# Patient Record
Sex: MALE | Race: ASIAN
[De-identification: names, ages, dates, MRNs, and addresses within clinical notes are randomized per-mention and may not be internally consistent; named-entity substitution may affect disease eponyms.]

---

## 2018-08-22 ENCOUNTER — HOSPITAL ENCOUNTER (OUTPATIENT)
Dept: HOSPITAL 92 - BICMRI | Age: 43
Discharge: HOME | End: 2018-08-22
Attending: INTERNAL MEDICINE
Payer: COMMERCIAL

## 2018-08-22 DIAGNOSIS — M79.641: ICD-10-CM

## 2018-08-22 DIAGNOSIS — M06.4: Primary | ICD-10-CM

## 2020-05-03 ENCOUNTER — HOSPITAL ENCOUNTER (EMERGENCY)
Dept: HOSPITAL 92 - ERS | Age: 45
Discharge: HOME | End: 2020-05-03
Payer: COMMERCIAL

## 2020-05-03 DIAGNOSIS — F90.9: ICD-10-CM

## 2020-05-03 DIAGNOSIS — R06.00: Primary | ICD-10-CM

## 2020-05-03 DIAGNOSIS — Z79.899: ICD-10-CM

## 2020-05-03 DIAGNOSIS — I10: ICD-10-CM

## 2020-05-03 DIAGNOSIS — Z20.828: ICD-10-CM

## 2020-05-03 LAB
ALBUMIN SERPL BCG-MCNC: 4.5 G/DL (ref 3.5–5)
ALP SERPL-CCNC: 74 U/L (ref 40–110)
ALT SERPL W P-5'-P-CCNC: 32 U/L (ref 8–55)
ANION GAP SERPL CALC-SCNC: 15 MMOL/L (ref 10–20)
AST SERPL-CCNC: 21 U/L (ref 5–34)
BASOPHILS # BLD AUTO: 0.1 THOU/UL (ref 0–0.2)
BASOPHILS NFR BLD AUTO: 1.4 % (ref 0–1)
BILIRUB SERPL-MCNC: 0.4 MG/DL (ref 0.2–1.2)
BUN SERPL-MCNC: 11 MG/DL (ref 8.9–20.6)
CALCIUM SERPL-MCNC: 9.4 MG/DL (ref 7.8–10.44)
CHLORIDE SERPL-SCNC: 109 MMOL/L (ref 98–107)
CK SERPL-CCNC: 120 U/L (ref 30–200)
CO2 SERPL-SCNC: 20 MMOL/L (ref 22–29)
CREAT CL PREDICTED SERPL C-G-VRATE: 0 ML/MIN (ref 70–130)
EOSINOPHIL # BLD AUTO: 0.3 THOU/UL (ref 0–0.7)
EOSINOPHIL NFR BLD AUTO: 4.4 % (ref 0–10)
GLOBULIN SER CALC-MCNC: 3.2 G/DL (ref 2.4–3.5)
GLUCOSE SERPL-MCNC: 102 MG/DL (ref 70–105)
HGB BLD-MCNC: 16 G/DL (ref 14–18)
LIPASE SERPL-CCNC: 96 U/L (ref 8–78)
LYMPHOCYTES # BLD: 3.4 THOU/UL (ref 1.2–3.4)
LYMPHOCYTES NFR BLD AUTO: 47.1 % (ref 21–51)
MCH RBC QN AUTO: 28.9 PG (ref 27–31)
MCV RBC AUTO: 84.9 FL (ref 78–98)
MONOCYTES # BLD AUTO: 0.6 THOU/UL (ref 0.11–0.59)
MONOCYTES NFR BLD AUTO: 7.6 % (ref 0–10)
NEUTROPHILS # BLD AUTO: 2.9 THOU/UL (ref 1.4–6.5)
NEUTROPHILS NFR BLD AUTO: 39.6 % (ref 42–75)
PLATELET # BLD AUTO: 260 THOU/UL (ref 130–400)
POTASSIUM SERPL-SCNC: 3.9 MMOL/L (ref 3.5–5.1)
RBC # BLD AUTO: 5.53 MILL/UL (ref 4.7–6.1)
SODIUM SERPL-SCNC: 140 MMOL/L (ref 136–145)
WBC # BLD AUTO: 7.2 THOU/UL (ref 4.8–10.8)

## 2020-05-03 PROCEDURE — 83690 ASSAY OF LIPASE: CPT

## 2020-05-03 PROCEDURE — 96374 THER/PROPH/DIAG INJ IV PUSH: CPT

## 2020-05-03 PROCEDURE — 96375 TX/PRO/DX INJ NEW DRUG ADDON: CPT

## 2020-05-03 PROCEDURE — U0003 INFECTIOUS AGENT DETECTION BY NUCLEIC ACID (DNA OR RNA); SEVERE ACUTE RESPIRATORY SYNDROME CORONAVIRUS 2 (SARS-COV-2) (CORONAVIRUS DISEASE [COVID-19]), AMPLIFIED PROBE TECHNIQUE, MAKING USE OF HIGH THROUGHPUT TECHNOLOGIES AS DESCRIBED BY CMS-2020-01-R: HCPCS

## 2020-05-03 PROCEDURE — 71045 X-RAY EXAM CHEST 1 VIEW: CPT

## 2020-05-03 PROCEDURE — 84484 ASSAY OF TROPONIN QUANT: CPT

## 2020-05-03 PROCEDURE — 85379 FIBRIN DEGRADATION QUANT: CPT

## 2020-05-03 PROCEDURE — 93005 ELECTROCARDIOGRAM TRACING: CPT

## 2020-05-03 PROCEDURE — 83880 ASSAY OF NATRIURETIC PEPTIDE: CPT

## 2020-05-03 PROCEDURE — 70450 CT HEAD/BRAIN W/O DYE: CPT

## 2020-05-03 PROCEDURE — 87635 SARS-COV-2 COVID-19 AMP PRB: CPT

## 2020-05-03 PROCEDURE — 82550 ASSAY OF CK (CPK): CPT

## 2020-05-03 PROCEDURE — 80053 COMPREHEN METABOLIC PANEL: CPT

## 2020-05-03 PROCEDURE — 85025 COMPLETE CBC W/AUTO DIFF WBC: CPT

## 2020-05-03 PROCEDURE — 96361 HYDRATE IV INFUSION ADD-ON: CPT

## 2020-05-03 NOTE — RAD
XR Chest 1 View Portable



HISTORY: Dyspnea



COMPARISON: 4/20/2020



FINDINGS: The heart size is normal. The lungs are well expanded without focal areas of consolidation,
 pneumothorax or pleural effusions.



IMPRESSION: No radiographic evidence of acute cardiopulmonary process.



Reported By: Jose Echevarria 

Electronically Signed:  5/3/2020 9:18 PM

## 2020-05-03 NOTE — CT
CT BRAIN WITHOUT CONTRAST:



HISTORY: Headache



FINDINGS:

No evidence of acute infarct, hemorrhage, midline shift or abnormal extra-axial fluid collections is 
seen. The ventricular size is appropriate and the basilar cisterns are patent. The bony calvarium

is intact. The mastoid air cells are well aerated. There is mucosal disease in the paranasal sinuses.




IMPRESSION: No CT evidence of acute intracranial process.



Reported By: Jose Echevarria 

Electronically Signed:  5/3/2020 9:54 PM

## 2020-05-06 ENCOUNTER — HOSPITAL ENCOUNTER (INPATIENT)
Dept: HOSPITAL 92 - ERS | Age: 45
LOS: 3 days | Discharge: HOME | DRG: 247 | End: 2020-05-09
Attending: INTERNAL MEDICINE | Admitting: INTERNAL MEDICINE
Payer: COMMERCIAL

## 2020-05-06 VITALS — BODY MASS INDEX: 30.1 KG/M2

## 2020-05-06 DIAGNOSIS — R05: ICD-10-CM

## 2020-05-06 DIAGNOSIS — E66.9: ICD-10-CM

## 2020-05-06 DIAGNOSIS — Z79.899: ICD-10-CM

## 2020-05-06 DIAGNOSIS — I10: ICD-10-CM

## 2020-05-06 DIAGNOSIS — R94.39: ICD-10-CM

## 2020-05-06 DIAGNOSIS — I08.1: ICD-10-CM

## 2020-05-06 DIAGNOSIS — Z79.52: ICD-10-CM

## 2020-05-06 DIAGNOSIS — I25.110: Primary | ICD-10-CM

## 2020-05-06 DIAGNOSIS — E78.5: ICD-10-CM

## 2020-05-06 DIAGNOSIS — Z82.49: ICD-10-CM

## 2020-05-06 DIAGNOSIS — F90.9: ICD-10-CM

## 2020-05-06 DIAGNOSIS — E78.00: ICD-10-CM

## 2020-05-06 LAB
ALBUMIN SERPL BCG-MCNC: 4.5 G/DL (ref 3.5–5)
ALP SERPL-CCNC: 54 U/L (ref 40–110)
ALT SERPL W P-5'-P-CCNC: 27 U/L (ref 8–55)
ANION GAP SERPL CALC-SCNC: 13 MMOL/L (ref 10–20)
AST SERPL-CCNC: 13 U/L (ref 5–34)
BASOPHILS # BLD AUTO: 0.1 THOU/UL (ref 0–0.2)
BASOPHILS NFR BLD AUTO: 0.4 % (ref 0–1)
BILIRUB SERPL-MCNC: 0.5 MG/DL (ref 0.2–1.2)
BUN SERPL-MCNC: 17 MG/DL (ref 8.9–20.6)
CALCIUM SERPL-MCNC: 9 MG/DL (ref 7.8–10.44)
CHLORIDE SERPL-SCNC: 106 MMOL/L (ref 98–107)
CK MB SERPL-MCNC: 1.1 NG/ML (ref 0–6.6)
CK SERPL-CCNC: 85 U/L (ref 30–200)
CO2 SERPL-SCNC: 24 MMOL/L (ref 22–29)
CREAT CL PREDICTED SERPL C-G-VRATE: 0 ML/MIN (ref 70–130)
DRUG SCREEN CUTOFF: (no result)
EOSINOPHIL # BLD AUTO: 0.1 THOU/UL (ref 0–0.7)
EOSINOPHIL NFR BLD AUTO: 0.5 % (ref 0–10)
GLOBULIN SER CALC-MCNC: 3 G/DL (ref 2.4–3.5)
GLUCOSE SERPL-MCNC: 139 MG/DL (ref 70–105)
HGB BLD-MCNC: 15.1 G/DL (ref 14–18)
LYMPHOCYTES # BLD: 2.4 THOU/UL (ref 1.2–3.4)
LYMPHOCYTES NFR BLD AUTO: 20.3 % (ref 21–51)
MCH RBC QN AUTO: 27.3 PG (ref 27–31)
MCV RBC AUTO: 85 FL (ref 78–98)
MEDTOX CONTROL LINE VALID?: (no result)
MEDTOX READER #: (no result)
MONOCYTES # BLD AUTO: 1.1 THOU/UL (ref 0.11–0.59)
MONOCYTES NFR BLD AUTO: 9.1 % (ref 0–10)
NEUTROPHILS # BLD AUTO: 8.4 THOU/UL (ref 1.4–6.5)
NEUTROPHILS NFR BLD AUTO: 69.6 % (ref 42–75)
PLATELET # BLD AUTO: 276 THOU/UL (ref 130–400)
POTASSIUM SERPL-SCNC: 3.8 MMOL/L (ref 3.5–5.1)
RBC # BLD AUTO: 5.54 MILL/UL (ref 4.7–6.1)
SODIUM SERPL-SCNC: 139 MMOL/L (ref 136–145)
WBC # BLD AUTO: 12 THOU/UL (ref 4.8–10.8)

## 2020-05-06 PROCEDURE — 93017 CV STRESS TEST TRACING ONLY: CPT

## 2020-05-06 PROCEDURE — 99152 MOD SED SAME PHYS/QHP 5/>YRS: CPT

## 2020-05-06 PROCEDURE — 93306 TTE W/DOPPLER COMPLETE: CPT

## 2020-05-06 PROCEDURE — 85025 COMPLETE CBC W/AUTO DIFF WBC: CPT

## 2020-05-06 PROCEDURE — 80053 COMPREHEN METABOLIC PANEL: CPT

## 2020-05-06 PROCEDURE — 93010 ELECTROCARDIOGRAM REPORT: CPT

## 2020-05-06 PROCEDURE — 82553 CREATINE MB FRACTION: CPT

## 2020-05-06 PROCEDURE — 92928 PRQ TCAT PLMT NTRAC ST 1 LES: CPT

## 2020-05-06 PROCEDURE — 83880 ASSAY OF NATRIURETIC PEPTIDE: CPT

## 2020-05-06 PROCEDURE — 78452 HT MUSCLE IMAGE SPECT MULT: CPT

## 2020-05-06 PROCEDURE — C1874 STENT, COATED/COV W/DEL SYS: HCPCS

## 2020-05-06 PROCEDURE — C1887 CATHETER, GUIDING: HCPCS

## 2020-05-06 PROCEDURE — A9500 TC99M SESTAMIBI: HCPCS

## 2020-05-06 PROCEDURE — 94760 N-INVAS EAR/PLS OXIMETRY 1: CPT

## 2020-05-06 PROCEDURE — 96365 THER/PROPH/DIAG IV INF INIT: CPT

## 2020-05-06 PROCEDURE — C9600 PERC DRUG-EL COR STENT SING: HCPCS

## 2020-05-06 PROCEDURE — 93798 PHYS/QHP OP CAR RHAB W/ECG: CPT

## 2020-05-06 PROCEDURE — 36415 COLL VENOUS BLD VENIPUNCTURE: CPT

## 2020-05-06 PROCEDURE — 80306 DRUG TEST PRSMV INSTRMNT: CPT

## 2020-05-06 PROCEDURE — 96367 TX/PROPH/DG ADDL SEQ IV INF: CPT

## 2020-05-06 PROCEDURE — 87040 BLOOD CULTURE FOR BACTERIA: CPT

## 2020-05-06 PROCEDURE — 82550 ASSAY OF CK (CPK): CPT

## 2020-05-06 PROCEDURE — 84484 ASSAY OF TROPONIN QUANT: CPT

## 2020-05-06 PROCEDURE — 80061 LIPID PANEL: CPT

## 2020-05-06 PROCEDURE — 71275 CT ANGIOGRAPHY CHEST: CPT

## 2020-05-06 PROCEDURE — 96375 TX/PRO/DX INJ NEW DRUG ADDON: CPT

## 2020-05-06 PROCEDURE — 80048 BASIC METABOLIC PNL TOTAL CA: CPT

## 2020-05-06 PROCEDURE — 71045 X-RAY EXAM CHEST 1 VIEW: CPT

## 2020-05-06 PROCEDURE — 99153 MOD SED SAME PHYS/QHP EA: CPT

## 2020-05-06 PROCEDURE — 93005 ELECTROCARDIOGRAM TRACING: CPT

## 2020-05-06 PROCEDURE — C1769 GUIDE WIRE: HCPCS

## 2020-05-06 PROCEDURE — C1725 CATH, TRANSLUMIN NON-LASER: HCPCS

## 2020-05-06 PROCEDURE — 85347 COAGULATION TIME ACTIVATED: CPT

## 2020-05-06 PROCEDURE — 93458 L HRT ARTERY/VENTRICLE ANGIO: CPT

## 2020-05-07 LAB
ANION GAP SERPL CALC-SCNC: 10 MMOL/L (ref 10–20)
BASOPHILS # BLD AUTO: 0.1 THOU/UL (ref 0–0.2)
BASOPHILS NFR BLD AUTO: 1 % (ref 0–1)
BUN SERPL-MCNC: 14 MG/DL (ref 8.9–20.6)
CALCIUM SERPL-MCNC: 8 MG/DL (ref 7.8–10.44)
CHD RISK SERPL-RTO: 3.8 (ref ?–4.5)
CHD RISK SERPL-RTO: 4 (ref ?–4.5)
CHLORIDE SERPL-SCNC: 108 MMOL/L (ref 98–107)
CHOLEST SERPL-MCNC: 172 MG/DL
CHOLEST SERPL-MCNC: 184 MG/DL
CK MB SERPL-MCNC: 0.7 NG/ML (ref 0–6.6)
CK MB SERPL-MCNC: 1.1 NG/ML (ref 0–6.6)
CK MB SERPL-MCNC: 1.2 NG/ML (ref 0–6.6)
CO2 SERPL-SCNC: 23 MMOL/L (ref 22–29)
CREAT CL PREDICTED SERPL C-G-VRATE: 148 ML/MIN (ref 70–130)
EOSINOPHIL # BLD AUTO: 0 THOU/UL (ref 0–0.7)
EOSINOPHIL NFR BLD AUTO: 0.4 % (ref 0–10)
GLUCOSE SERPL-MCNC: 90 MG/DL (ref 70–105)
HDLC SERPL-MCNC: 43 MG/DL
HDLC SERPL-MCNC: 49 MG/DL
HGB BLD-MCNC: 12.7 G/DL (ref 14–18)
LDLC SERPL CALC-MCNC: 117 MG/DL
LDLC SERPL CALC-MCNC: 97 MG/DL
LYMPHOCYTES # BLD: 3.4 THOU/UL (ref 1.2–3.4)
LYMPHOCYTES NFR BLD AUTO: 34 % (ref 21–51)
MCH RBC QN AUTO: 27.9 PG (ref 27–31)
MCV RBC AUTO: 86.3 FL (ref 78–98)
MONOCYTES # BLD AUTO: 0.7 THOU/UL (ref 0.11–0.59)
MONOCYTES NFR BLD AUTO: 7 % (ref 0–10)
NEUTROPHILS # BLD AUTO: 5.8 THOU/UL (ref 1.4–6.5)
NEUTROPHILS NFR BLD AUTO: 57.6 % (ref 42–75)
PLATELET # BLD AUTO: 235 THOU/UL (ref 130–400)
POTASSIUM SERPL-SCNC: 3.8 MMOL/L (ref 3.5–5.1)
RBC # BLD AUTO: 4.57 MILL/UL (ref 4.7–6.1)
SODIUM SERPL-SCNC: 137 MMOL/L (ref 136–145)
TRIGL SERPL-MCNC: 159 MG/DL (ref ?–150)
TRIGL SERPL-MCNC: 90 MG/DL (ref ?–150)
TROPONIN I SERPL DL<=0.01 NG/ML-MCNC: 0.15 NG/ML (ref ?–0.03)
WBC # BLD AUTO: 10 THOU/UL (ref 4.8–10.8)

## 2020-05-07 RX ADMIN — Medication PRN ML: at 01:31

## 2020-05-07 RX ADMIN — Medication PRN ML: at 21:43

## 2020-05-07 NOTE — RAD
CHEST 1 VIEW:

 

Date:  05/06/2020

 

HISTORY:  

Dyspnea. 

 

COMPARISON:  

Radiograph dated 05/03/2020. 

 

FINDINGS:

Lungs are clear. No pneumothorax or effusion. Cardiac silhouette and mediastinal contours are within 
normal limits. No acute osseous abnormality. 

 

IMPRESSION: 

No acute intrathoracic abnormality. 

 

 

POS: HOME

## 2020-05-07 NOTE — PDOC.HOSPP
- Subjective


Encounter Date: 05/07/20


Encounter Time: 15:00


Subjective: 





Patient seen and examined for USA. No CP. SOB on mild to mod exertion. No new 

complaints. No overnight events





- Objective


Vital Signs & Weight: 


 Vital Signs (12 hours)











  Temp Pulse Resp BP Pulse Ox


 


 05/07/20 15:21  98.1 F  70  16  142/86 H  99


 


 05/07/20 11:24  97.8 F  70  18  147/88 H  99


 


 05/07/20 07:35      99


 


 05/07/20 07:07  97.7 F  68  16  123/73  99








 Weight











Weight                         186 lb 12.8 oz














I&O: 


 











 05/06/20 05/07/20 05/08/20





 06:59 06:59 06:59


 


Intake Total  30 


 


Balance  30 











Result Diagrams: 


 05/07/20 04:35





 05/07/20 04:35


Additional Labs: 





 Laboratory Tests











  05/07/20





  07:35


 


Troponin I  0.291 H











EKG Reviewed by me: Yes (Tele SR)





Hospitalist ROS





- Review of Systems


Cardiovascular: denies: chest pain, palpitations, orthopnea, paroxysmal noc. 

dyspnea, edema, light headedness, other


Gastrointestinal: denies: nausea, vomiting, abdominal pain, diarrhea, 

constipation, melena, hematochezia, other





- Medication


Medications: 


Active Medications











Generic Name Dose Route Start Last Admin





  Trade Name Musaq  PRN Reason Stop Dose Admin


 


Acetaminophen  650 mg  05/07/20 00:18  05/07/20 01:31





  Tylenol  PO   650 mg





  Q4H PRN   Administration





  Headache/Fever/Mild Pain (1-3)   





     





     





     


 


Sodium Chloride  10 ml  05/07/20 00:18  05/07/20 01:31





  Flush - Normal Saline  IVF   10 ml





  Q12HR PRN   Administration





  Saline Flush   





     





     





     














- Exam


General Appearance: NAD


Heart: RRR, no rubs


Respiratory: CTAB, no rales, no ronchi


Gastrointestinal: soft, non-tender, non-distended, normal bowel sounds


Extremities: no cyanosis





Hosp A/P





- Plan


DVT proph w/SCDs





SOB/Unstable angina


HTN


Obesity BMI 30.1


Mod MR





PLAN:


Await Cardiology input


COVID negative


CTA - no PE


Add ASA/Statins


Check fasting lipid profile


Restart Losartan


Cont other meds as above

## 2020-05-07 NOTE — CT
CT ANGIOGRAM CHEST WITH CONTRAST:

 

Date:  05/06/2020

 

HISTORY:  

Shortness of breath. 

 

COMPARISON:  

Radiograph dated 05/03/2020 for reference. 

 

FINDINGS:

CT angiogram chest performed after the intravenous administration of contrast. 3D rendering provided.
 

 

No proximal segmental pulmonary arterial filling defect. No pericardial effusion. 

 

Limited evaluation of the upper abdomen is unremarkable. No mediastinal adenopathy. 

 

No confluent air space consolidation. No pneumothorax or effusion. No suspicious pulmonary nodule. Th
e sternum and manubrium are intact. Thoracic spine intact. Celiac trunk intact. 

 

IMPRESSION: 

1.  No pulmonary embolism. 

2.  No evidence for pneumonia. 

3.  No acute inflammatory process within the chest. 

 

POS: HOME

## 2020-05-07 NOTE — PDOC.HHP
Hospitalist HPI





- History of Present Illness


Chest pain and SOB


History of Present Illness: 


Patient presents complaining of progressively worsening shortness of breath for 

the last several days and more frequent episodes of "chest, neck and jaw 

spasming". Patient states the shortness of breath occurs with any exertion, 

including walking to the bathroom and has gotten worse.  He reports having a 

coughing fit after the spasms he experience which initially occured 1-2 times a 

day and now occur 3-4 times a day.  





He describes feeling as if his chest muscles, neck muscles and jaw get tight 

and start to spasm. In order for him to be able to breathe he has to force 

himself to take a deep breath and hold it.  Otherwise he feels the spasm recur 

if he breathes normally. After these episodes he experiences coughing fits that 

are dry.  Denies any hemoptysis.  





This all began when he became unwell with a productive cough at the end of 

April.  He was seen at an urgent care center and tested negative for COVID.   

His symptoms persisted prompting him to return to the ED 4 days ago and again 

had COVID testing which was negative.  





Known history of HTN but no known history of COPD or Asthma.  No history of 

heart failure.  Has never had an echo done.  At present he feels well and is 

without any pain.   


ED Course: 





EKG done in NSR< HR 77. No ST changes or T wave abnormalities.





Chest X ray unremarkable. 





Initial trop 0.113, CK 85.


WCC 12, Hgb 15.1, Hct 47.1, Platelets 276.


CMP unremarkable.  





He was started on IV antibiotics (Azithromycin) and Rocephin.


Also given aspirin 325 mg PO. 


1L of NS given and 1 inch nitro given as well. 





Hospitalist ROS





- Review of Systems


Constitutional: denies: fever, chills, sweats, weakness, malaise, other


Eyes: denies: pain, vision change, conjunctivae inflammation, eyelid 

inflammation, redness, other


ENT: denies: ear pain, ear discharge, nose pain, nose discharge, nose congestion

, mouth pain, mouth swelling, throat pain, throat swelling, other


Respiratory: reports: cough (clear sputum), SOB with excertion


Cardiovascular: reports: chest pain (radiating to neck and jaw as well as left 

arm, described as spasms/tightness).  denies: palpitations, orthopnea, 

paroxysmal noc. dyspnea, edema, light headedness, other


Gastrointestinal: denies: nausea, vomiting, abdominal pain, diarrhea, 

constipation, melena, hematochezia, other


Genitourinary: denies: dysuria, frequency, incontinence, hematuria, retention, 

other


Musculoskeletal: reports: neck pain (spasms to anterior neck).  denies: 

shoulder pain, arm pain, back pain, hand pain, leg pain, foot pain, other


Skin: denies: rash, lesions, laurent, bruising, other


Neurological: denies: weakness, numbness, incoordination, change in speech, 

confusion, seizures, other





- Medication


Medications: 





losartan-hydrochlorothiazide 


TABLET : Strength - 100 mg-25 mg : ORAL


Patient Dose: 1 tab(s) Oral once a day. 


predniSONE 


20 mg : Strength - TABLET : ORAL


Patient Dose: 3 tab(s) Oral once a day.





Hospitalist History





- Past Medical History


Source: patient


Cardiac: reports: HTN


Psych: reports: Other (ADHD)





- Past Surgical History


Past Surgical History: reports: no pertinent history





- Family History


Family History: reports: no pertinent history





- Social History


Smoking Status: Never smoker


Alcohol: reports: Occassional


Drugs: reports: none


Living Situation: With Family


Activity level: independent ambulation





- Exam


General Appearance: NAD


Eye: PERRL, anicteric sclera


ENT: normocephalic atraumatic, no oropharyngeal lesions, moist mucosa


Neck: supple, no lymphadenopathy


Heart: RRR, no murmur, no gallops, normal peripheral pulses


Respiratory: CTAB, no wheezes, no rales, no ronchi, normal chest expansion, no 

tachypnea


Gastrointestinal: soft, non-tender, non-distended, normal bowel sounds, no 

guarding, no rigidity


Extremities: no cyanosis, no clubbing, no edema


Skin: normal turgor, no rashes


Neurological: cranial nerve grossly intact


Musculoskeletal: normal tone, normal strength, no muscle wasting


Psychiatric: normal affect, normal behavior, A&O x 3





Hospitalist Results





- Labs


Result Diagrams: 


 05/06/20 22:08





 05/06/20 22:08


Lab results: 


 











WBC  12.0 thou/uL (4.8-10.8)  H  05/06/20  22:08    


 


Hgb  15.1 g/dL (14.0-18.0)   05/06/20  22:08    


 


Hct  47.1 % (42.0-52.0)   05/06/20  22:08    


 


MCV  85.0 fL (78.0-98.0)   05/06/20  22:08    


 


Plt Count  276 thou/uL (130-400)   05/06/20  22:08    


 


Neutrophils %  69.6 % (42.0-75.0)   05/06/20  22:08    


 


Sodium  139 mmol/L (136-145)   05/06/20  22:08    


 


Potassium  3.8 mmol/L (3.5-5.1)   05/06/20  22:08    


 


Chloride  106 mmol/L ()   05/06/20  22:08    


 


Carbon Dioxide  24 mmol/L (22-29)   05/06/20  22:08    


 


BUN  17 mg/dL (8.9-20.6)   05/06/20  22:08    


 


Creatinine  0.87 mg/dL (0.7-1.3)   05/06/20  22:08    


 


Glucose  139 mg/dL ()  H  05/06/20  22:08    


 


Calcium  9.0 mg/dL (7.8-10.44)   05/06/20  22:08    


 


Total Bilirubin  0.5 mg/dL (0.2-1.2)   05/06/20  22:08    


 


AST  13 U/L (5-34)   05/06/20  22:08    


 


ALT  27 U/L (8-55)   05/06/20  22:08    


 


Alkaline Phosphatase  54 U/L ()   05/06/20  22:08    


 


Creatine Kinase  85 U/L ()   05/06/20  22:08    


 


CK-MB (CK-2)  1.1 ng/mL (0-6.6)   05/06/20  22:32    


 


Troponin I  0.113 ng/mL (< 0.028)  H  05/06/20  22:32    


 


B-Natriuretic Peptide  28.1 pg/mL (0-100)   05/06/20  22:08    


 


Serum Total Protein  7.5 g/dL (6.0-8.3)   05/06/20  22:08    


 


Albumin  4.5 g/dL (3.5-5.0)   05/06/20  22:08    














- Radiology Interpretation


  ** Chest x-ray


Status: report reviewed by me





  ** CT scan - chest


Status: pending





Hospitalist H&P A/P





- Problem


(1) Chest tightness


Code(s): R07.89 - OTHER CHEST PAIN   Status: Acute   





(2) Shortness of breath on exertion


Code(s): R06.02 - SHORTNESS OF BREATH   Status: Acute   





(3) Cough


Code(s): R05 - COUGH   Status: Acute   





(4) Troponin level elevated


Code(s): R79.89 - OTHER SPECIFIED ABNORMAL FINDINGS OF BLOOD CHEMISTRY   Status

: Acute   





(5) Hypertension


Code(s): I10 - ESSENTIAL (PRIMARY) HYPERTENSION   Status: Chronic   


Qualifiers: 


   Hypertension type: essential hypertension   Qualified Code(s): I10 - 

Essential (primary) hypertension   





(6) ADHD


Status: Acute   





- Plan


Plan: 


Cardiac monitoring


Continue to trend troponin


Echo ordered and Cardiology consult as per discussion with Dr. Barber. 


UDS pending. 


CTA ordered. No evidence of PE, per ED physician.  Final report pending. 


NPO at midnight, pending cardiology assessment. 


Monitor BP. 


Resume home medications once verified.





CODE STATUS FULL


SURROGATE DECISION MAKER: His wife, Patria Neal.

## 2020-05-07 NOTE — CON
DATE OF CONSULTATION:  



HISTORY OF PRESENT ILLNESS:  This is a 44-year-old male who for the last 1-2 
weeks

has had problems with cough and chest pain.  He states he will feel mucus in his

throat, which will make him cough and then at times with coughing, he will have

chest discomfort.  He had one episode that was so bad, that it lasted 
approximately 30

minutes with aching in his chest.  He has been evaluated in outpatient Urgent 
Care

Clinic and then the ER and tested negative for COVID twice.  At the present time
, he has no

complaints. 



PAST MEDICAL HISTORY:  

1. Hypertension.  

2. Adult attention deficit disorder.

3. No history of diabetes or hypercholesterolemia.



MEDICATIONS:  Losartan 100 mg daily (he has been on this for several years 
without

any dosage change). 



ALLERGIES:  NONE.



OPERATIONS:  None.



SOCIAL HISTORY:  He does not smoke.  Rarely drinks.



FAMILY HISTORY:  Remarkable for father who has had 2 previous bypass surgeries.



REVIEW OF SYSTEMS:  Otherwise unremarkable.



PHYSICAL EXAMINATION:

VITAL SIGNS:  147/88, pulse of 70. 

HEENT:  PERRL. 

NECK:  Supple. 

CHEST:  Clear. 

CARDIAC:  S1 and S2 normal without any S3, S4, or murmurs. 

ABDOMEN:  Normal bowel sounds without tenderness or organomegaly. 

EXTREMITIES:  Revealed no clubbing, cyanosis, or edema. 

NEUROLOGIC:  Grossly intact. SKIN:  Warm and dry. MUSCULOSKELETAL:  Examination

revealed palpable chest wall tenderness that seems to reproduce a lot of his 
pain. 



LABORATORY DATA:  EKG normal sinus rhythm.  Hemoglobin 12.7, hematocrit 39.5, 
white

count 54194, platelets 235,000.  Sodium 137, potassium 3.8, chloride 108, carbon

dioxide 23, BUN 14, creatinine 0.78 troponin I is up to 0.291.  Urine drug 
screen is

unremarkable. 



IMAGING:  Chest x-ray revealed no acute abnormality.   

CT angiogram of the chest revealed no evidence for pulmonary emboli.  There is 
no

evidence of pneumonia or any acute inflammatory process of the chest.  No 
mention is

made of coronary artery calcifications. 



IMPRESSION:  

1. Non-ST-elevation myocardial infarction, probably type 2.

2. Chest wall pain from coughing.

3. Cough of uncertain etiology.  He does not appear to have pneumonia and has no

history of asthma.  In rare instances, angiotensin-receptor blocker drugs may 
cause

people to have cough and if no other etiology is found, this may need to be

considered. 

4. Hypertension.

5. Positive family history.

6. LDL of 68 in May 2019.



RECOMMENDATIONS:  

1. His current chest pain appears to be musculoskeletal in nature, probably 
related

to his cough.  Echocardiogram revealed normal left ventricular function with

moderate mitral regurgitation.  

2. His CT angiogram did not reveal any coronary artery calcifications, which 
makes

the likelihood of coronary artery disease extremely low. 

3. Another fasting lipid profile will be obtained with his family history.  

4. He will undergo Lexiscan Cardiolite testing to further evaluate.





Job ID:  [id number]



[facility]

## 2020-05-08 LAB
CK MB SERPL-MCNC: 0.6 NG/ML (ref 0–6.6)
CK MB SERPL-MCNC: 0.6 NG/ML (ref 0–6.6)
CK MB SERPL-MCNC: 0.7 NG/ML (ref 0–6.6)

## 2020-05-08 PROCEDURE — 4A023N7 MEASUREMENT OF CARDIAC SAMPLING AND PRESSURE, LEFT HEART, PERCUTANEOUS APPROACH: ICD-10-PCS

## 2020-05-08 PROCEDURE — B2151ZZ FLUOROSCOPY OF LEFT HEART USING LOW OSMOLAR CONTRAST: ICD-10-PCS

## 2020-05-08 PROCEDURE — 027035Z DILATION OF CORONARY ARTERY, ONE ARTERY WITH TWO DRUG-ELUTING INTRALUMINAL DEVICES, PERCUTANEOUS APPROACH: ICD-10-PCS

## 2020-05-08 PROCEDURE — B2111ZZ FLUOROSCOPY OF MULTIPLE CORONARY ARTERIES USING LOW OSMOLAR CONTRAST: ICD-10-PCS

## 2020-05-08 RX ADMIN — ASPIRIN SCH MG: 81 TABLET ORAL at 11:28

## 2020-05-08 RX ADMIN — TICAGRELOR SCH MG: 90 TABLET ORAL at 21:03

## 2020-05-08 NOTE — NM
EXAM:

NM Cardiac Stress W EF   WF



PROVIDED CLINICAL HISTORY:

Chest pain and elevated troponins



COMPARISON:

None



FINDINGS:

There is diminished uptake of radiotracer seen within the inferior and inferolateral left ventricular
 wall. The area of diminished uptake is slightly greater laterally on the stress acquisition

compared to resting acquisition. Diminished thickening is present in the inferior left ventricular wa
ll on gated images. Normal ventricular wall motion is present. Calculated left ventricular ejection

fraction is 54%.



IMPRESSION:



1. Abnormal myocardial perfusion study with findings suggestive of scarring in the inferior and infer
olateral left ventricular wall with question of minimal saeed-infarct ischemia. No large reversible

defect is seen.

2. LVEF of 54%.



Reported By: Roberto Paz 

Electronically Signed:  5/8/2020 11:58 AM

## 2020-05-08 NOTE — PDOC.HOSPP
- Subjective


Encounter Date: 05/08/20


Encounter Time: 16:00


Subjective: 





Patient seen and examined for USA. s/p Cx stent. No CP. No new complaints. No 

overnight events





- Objective


Vital Signs & Weight: 


 Vital Signs (12 hours)











  Temp Pulse Resp BP BP BP Pulse Ox


 


 05/08/20 11:33  98.3 F  66  18   158/87 H   98


 


 05/08/20 11:28   66   158/87 H   


 


 05/08/20 07:41  98.4 F  71  16    135/73  99








 Weight











Weight                         186 lb 12.8 oz














I&O: 


 











 05/07/20 05/08/20 05/09/20





 06:59 06:59 06:59


 


Intake Total 30  


 


Balance 30  











Result Diagrams: 


 05/07/20 04:35





 05/07/20 04:35


EKG Reviewed by me: Yes (Tele SR)





Hospitalist ROS





- Review of Systems


Respiratory: denies: cough, dry, shortness of breath, hemoptysis, SOB with 

excertion, pleuritic pain, sputum, wheezing, other


Cardiovascular: denies: chest pain, palpitations, orthopnea, paroxysmal noc. 

dyspnea, edema, light headedness, other





- Medication


Medications: 


Active Medications











Generic Name Dose Route Start Last Admin





  Trade Name Freq  PRN Reason Stop Dose Admin


 


Acetaminophen  650 mg  05/07/20 00:18  05/07/20 01:31





  Tylenol  PO   650 mg





  Q4H PRN   Administration





  Headache/Fever/Mild Pain (1-3)   





     





     





     


 


Aspirin  81 mg  05/08/20 09:00  05/08/20 11:28





  Ecotrin  PO   81 mg





  DAILY MIKE   Administration





     





     





     





     


 


Atorvastatin Calcium  40 mg  05/07/20 21:00  05/07/20 21:43





  Lipitor  PO   40 mg





  HS MIKE   Administration





     





     





     





     


 


Pantoprazole Sodium  40 mg  05/07/20 21:00  05/07/20 21:43





  Protonix  PO   40 mg





  HS MIKE   Administration





     





     





     





     


 


Sodium Chloride  10 ml  05/07/20 00:18  05/07/20 21:43





  Flush - Normal Saline  IVF   10 ml





  Q12HR PRN   Administration





  Saline Flush   





     





     





     














- Exam


General Appearance: NAD


Neck: supple, no JVD


Heart: RRR, no gallops, no rubs


Respiratory: no wheezes, no rales, no ronchi


Gastrointestinal: soft, non-tender





Hosp A/P





- Plan


DVT proph w/SCDs





SOB/Unstable angina


Abn Stress test


CAD s/p L Cx stent


HTN


Obesity BMI 30.1


Mod MR


Family h/o heart disease





PLAN:


Cont ASA/Statins


Started on Brilinta/Metoprolol


Cont other meds as above


DC in AM if stable


Cont other meds

## 2020-05-09 VITALS — DIASTOLIC BLOOD PRESSURE: 84 MMHG | SYSTOLIC BLOOD PRESSURE: 134 MMHG | TEMPERATURE: 97.7 F

## 2020-05-09 LAB
ALBUMIN SERPL BCG-MCNC: 3.8 G/DL (ref 3.5–5)
ALP SERPL-CCNC: 47 U/L (ref 40–110)
ALT SERPL W P-5'-P-CCNC: 25 U/L (ref 8–55)
ANION GAP SERPL CALC-SCNC: 10 MMOL/L (ref 10–20)
AST SERPL-CCNC: 11 U/L (ref 5–34)
BASOPHILS # BLD AUTO: 0 THOU/UL (ref 0–0.2)
BASOPHILS NFR BLD AUTO: 0.4 % (ref 0–1)
BILIRUB SERPL-MCNC: 1.1 MG/DL (ref 0.2–1.2)
BUN SERPL-MCNC: 11 MG/DL (ref 8.9–20.6)
CALCIUM SERPL-MCNC: 8.3 MG/DL (ref 7.8–10.44)
CHLORIDE SERPL-SCNC: 109 MMOL/L (ref 98–107)
CO2 SERPL-SCNC: 21 MMOL/L (ref 22–29)
CREAT CL PREDICTED SERPL C-G-VRATE: 136 ML/MIN (ref 70–130)
EOSINOPHIL # BLD AUTO: 0.2 THOU/UL (ref 0–0.7)
EOSINOPHIL NFR BLD AUTO: 2.2 % (ref 0–10)
GLOBULIN SER CALC-MCNC: 2.6 G/DL (ref 2.4–3.5)
GLUCOSE SERPL-MCNC: 147 MG/DL (ref 70–105)
HGB BLD-MCNC: 14.7 G/DL (ref 14–18)
LYMPHOCYTES # BLD: 2.3 THOU/UL (ref 1.2–3.4)
LYMPHOCYTES NFR BLD AUTO: 27.8 % (ref 21–51)
MCH RBC QN AUTO: 28.1 PG (ref 27–31)
MCV RBC AUTO: 85 FL (ref 78–98)
MONOCYTES # BLD AUTO: 0.5 THOU/UL (ref 0.11–0.59)
MONOCYTES NFR BLD AUTO: 5.6 % (ref 0–10)
NEUTROPHILS # BLD AUTO: 5.4 THOU/UL (ref 1.4–6.5)
NEUTROPHILS NFR BLD AUTO: 64.1 % (ref 42–75)
PLATELET # BLD AUTO: 238 THOU/UL (ref 130–400)
POTASSIUM SERPL-SCNC: 3.6 MMOL/L (ref 3.5–5.1)
RBC # BLD AUTO: 5.22 MILL/UL (ref 4.7–6.1)
SODIUM SERPL-SCNC: 136 MMOL/L (ref 136–145)
WBC # BLD AUTO: 8.4 THOU/UL (ref 4.8–10.8)

## 2020-05-09 RX ADMIN — TICAGRELOR SCH MG: 90 TABLET ORAL at 08:46

## 2020-05-09 RX ADMIN — ASPIRIN SCH MG: 81 TABLET ORAL at 08:46

## 2020-05-09 NOTE — PRG
DATE OF SERVICE:  05/09/2020



SUBJECTIVE:  Mr. Neal is doing well.  He has no chest pain or pressure.  Feels

well. 



OBJECTIVE:  VITAL SIGNS:  His blood pressure is 129/77, pulse 70. 

LUNGS:  Clear. 

CARDIAC:  Normal S1, normal S2. 

ABDOMEN:  Soft, nontender.



ASSESSMENT:  

1. Coronary artery disease with small vessels.

2. Status post successful stent implantation.

3. Hypercholesterolemia.



PLAN:  

1. He will go home on;.

    a. Zetia 10 mg a day.

    b. Metoprolol 25 mg twice a day.

    c. Ticagrelor 90 mg twice a day.

    d. Aspirin 81 mg a day.

    e. Zetia 10 mg a day.

    f. Atorvastatin 40 mg a day.

    g. Amlodipine 5 mg a day.

2. He will follow up with Dr. Corona as an outpatient.







Job ID:  [id number]

## 2020-05-09 NOTE — DIS
DATE OF ADMISSION:  05/07/2020



DATE OF DISCHARGE:  05/09/2020



DISCHARGE DISPOSITION:  Home.



FOLLOWUP:  

1. Follow up with Dr. Vaibhav Orantes in 1 week.

2. Follow up with Dr. Nuno Corona in 2 to 3 weeks.



ALLERGIES:  NO KNOWN DRUG ALLERGIES.



PHYSICAL EXAMINATION:

The patient was seen on the day of discharge. 

VITAL SIGNS:  Showed temperature 97.7, with pulse rate of 86, respirations of 16,

blood pressure of 134/84, O2 saturation 98% on room air. 



DISCHARGE MEDICATIONS:  

1. Aspirin 81 mg daily. 

2. Brilinta 90 mg b.i.d. 

3. Lopressor 25 mg b.i.d. 

4. Zetia 10 mg daily.

5. Lipitor 40 mg at bedtime. 

6. Sublingual nitroglycerin as needed.



BRIEF HOSPITAL COURSE:  The patient is a 44-year-old male with hypertension,

presented to the emergency room with shortness of breath along with chest tightness.

 His workup was consistent with unstable angina.  His maximum troponin this

admission was 0.210 with normal CK-MB.  The patient was evaluated by Cardiology, Dr. Nuno Corona.  He underwent a stress testing that showed scarring in the inferior

and the inferolateral left ventricular wall with probable saeed-infarct ischemia.

Ejection fraction was 54%.  Echocardiogram showed ejection fraction of 50% to 55%

with moderate mitral regurgitation, mild tricuspid regurgitation.  The patient

underwent cardiac catheterization yesterday with drug-eluting stent placement to the

mid circumflex and proximal circumflex.  He was found to have 30% lesion in the mid

LAD, 70% lesion in the first diagonal, 60% lesion in the distal LAD.  RCA was a very

small vessel with 80% stenosis in the first marginal and 99% lesion in the distal

RCA.  The patient has been cleared by Cardiology for discharge. 



SIGNIFICANT LABORATORY DATA:  Fasting lipid showed triglyceride of 90 with total

cholesterol of 184, LDL of 117, HDL of 49. 



FINAL DIAGNOSES:  

1. Unstable angina.

2. Shortness of breath secondary to above.

3. Abnormal stress test.

4. Three vessel coronary artery disease status post left circumflex stent placement.

5. Hypertension.

6. Moderate mitral regurgitation.

7. Obesity with a body mass index of 30.1.

8. Dyslipidemia.

9. Family history of heart disease. 

The patient understands the above plan of care.







Job ID:  [id number]

## 2020-05-10 NOTE — EKG
Test Reason : 

Blood Pressure : ***/*** mmHG

Vent. Rate : 080 BPM     Atrial Rate : 080 BPM

   P-R Int : 132 ms          QRS Dur : 084 ms

    QT Int : 376 ms       P-R-T Axes : 038 027 011 degrees

   QTc Int : 433 ms

 

Normal sinus rhythm

Normal ECG

When compared with ECG of 06-MAY-2020 22:23, (Unconfirmed)

Nonspecific T wave abnormality, worse in Inferior leads

Confirmed by CLAUDE REYES (2) on 5/10/2020 4:19:57 PM

 

Referred By:  SHAHID           Confirmed By:CLAUDE REYES

## 2020-05-11 NOTE — PQF
UMBERTO LPOEZ MALIK MD

[id number]                                                             [facility] [address]

[id number]                             

                                   

CLINICAL DOCUMENTATION CLARIFICATION FORM:  POST DISCHARGE



Addendum to original discharge summary date:  __________________________________
____



Late entry note date:  _________________________________________________________
__











DATE:5/11/2020                                             ATTN: Blair Blanchard



Please exercise your independent, professional judgment in responding to the 
clarification form. 

Clinical indicators are provided on the bottom of this form for your review



Please check appropriate box(s):

[  ] Acute Coronary Syndrome (ACS) without Acute MI meaning Unstable Angina

[  ] NSTEMI (MI type I)

[  ] NSTEMI type II due to: (Please specify condition)_____________

[  ] Other condition:_______________

[ x ] Unable to determine



In addition, please specify:

Present on Admission (POA):  [  ] Yes             [  ] No             [  ] 
Unable to determine





CLINICAL INDICATORS - SIGNS / SYMPTOMS / LABS

Laboratory 5/6  Ck-MB 1.1, Troponin 0.113; 0.149; 0.210, BNP 28.1

Vital signs 168/100, Pulse 108, Resp 20, temp 98.2

EKG 5/6 Impression in NSR <HR 77. No ST changes or Twave abnormalities

H&P p1 5/7 Dr Frederick complaining of progressively worsening SOB for last 
several days and more frequent episodes of chest, neck and jaw spasmi

H&P p4 5/7 Dr Frederick Chest tightness, SOB on exertion, cough and Troponin level 
elevated

Consult p2 5/8 Dr Corona Non-ST-elevation myocardial infarction, probably 
type 2

Discharge summary p1 5/9 Dr Conti his workup consistent with unstable angina

Discharge summary p2 5/9 Dr Conti Abnormal stress test





RISKS:

H&P p2 5/7  HTN

PN p1 5/9 - Hypercholesterolemia

Discharge summary p1 5/9  Mitral regurgitation with tricuspid regurgitation

Discharge summary p2 5/9   CAD with unstable angina

Discharge summary p2 5/9  Obesity BMI 30.1

Discharge summary p2 5/9  HLD

Discharge summary p2 5/9  Family hx of heart disease





TREATMENTS:

Cardiac Catheterization with JAMES placement 5/8 Dr Turner

MAR 5/6  Nitroglycerin 1inch Ointment

MAR 5/6  IV Morphine 2mg

MAR 5/6  Aspirin 325 mg oral

MAR 5/7  Cozaar 100 mg oral

MAR 5/8  IV nitroglycerine 250 mg

Cardiology consult 5/7  Nuno Saenz

TTE 5/7

Stress test nuclear medicine 5/8

EKG 5/6







(This form is maintained as a part of the permanent medical record)

2015 [employer]  All Rights Reserved

[email or web address]    [phone]

                                                              



 



MTDD